# Patient Record
Sex: MALE
[De-identification: names, ages, dates, MRNs, and addresses within clinical notes are randomized per-mention and may not be internally consistent; named-entity substitution may affect disease eponyms.]

---

## 2013-06-26 RX ORDER — FLUCONAZOLE 150 MG/1
TABLET ORAL
Qty: 2 | Refills: 1
Start: 2013-06-26

## 2013-06-26 RX ORDER — ADAPALENE/BENZOYL PEROXIDE 0.1 %-2.5%
GEL (GRAM) TOPICAL
Qty: 45 | Refills: 6 | Status: DISCONTINUED
Start: 2013-06-26 | End: 2014-08-26

## 2013-06-26 RX ORDER — DOXYCYCLINE HYCLATE 150 MG/1
TABLET, DELAYED RELEASE ORAL
Qty: 30 | Refills: 2 | Status: DISCONTINUED
Start: 2013-06-26 | End: 2013-12-19

## 2020-10-21 ENCOUNTER — RX ONLY (RX ONLY)
Age: 30
End: 2020-10-21

## 2020-10-21 ENCOUNTER — SKIN CHECK (OUTPATIENT)
Dept: URBAN - METROPOLITAN AREA CLINIC 19 | Facility: CLINIC | Age: 30
Setting detail: DERMATOLOGY
End: 2020-10-21

## 2020-10-21 PROCEDURE — 99202 OFFICE O/P NEW SF 15 MIN: CPT

## 2020-10-21 RX ORDER — KETOCONAZOLE 20 MG/ML
SHAMPOO, SUSPENSION TOPICAL
Qty: 120 | Refills: 0
Start: 2020-10-21

## 2020-10-21 RX ORDER — FLUOCINOLONE ACETONIDE 0.11 MG/ML
OIL TOPICAL
Qty: 118.28 | Refills: 1
Start: 2020-10-21

## 2021-01-06 ENCOUNTER — RX ONLY (RX ONLY)
Age: 31
End: 2021-01-06

## 2021-01-06 ENCOUNTER — FOLLOW-UP (OUTPATIENT)
Dept: URBAN - METROPOLITAN AREA CLINIC 19 | Facility: CLINIC | Age: 31
Setting detail: DERMATOLOGY
End: 2021-01-06

## 2021-01-06 DIAGNOSIS — L91.0 HYPERTROPHIC SCAR: ICD-10-CM

## 2021-01-06 PROBLEM — L30.9 DERMATITIS, UNSPECIFIED: Status: RESOLVED | Noted: 2021-01-06

## 2021-01-06 PROCEDURE — 99213 OFFICE O/P EST LOW 20 MIN: CPT

## 2021-01-06 PROCEDURE — 11102 TANGNTL BX SKIN SINGLE LES: CPT

## 2021-01-06 RX ORDER — CLOBETASOL PROPIONATE 0.5 MG/ML
1 A SMALL AMOUNT SOLUTION TOPICAL ONCE A DAY
Qty: 50 | Refills: 0
Start: 2021-01-06

## 2021-01-06 RX ORDER — CLOBETASOL PROPIONATE 0.5 MG/G
1 A SMALL AMOUNT CREAM TOPICAL TWICE A DAY
Qty: 45 | Refills: 0
Start: 2021-01-06

## 2021-01-15 ENCOUNTER — RX ONLY (RX ONLY)
Age: 31
End: 2021-01-15

## 2021-01-15 RX ORDER — CALCIPOTRIENE AND BETAMETHASONE DIPROPIONATE 50; .5 UG/G; MG/G
A SMALL AMOUNT AEROSOL, FOAM TOPICAL ONCE A DAY
Qty: 60 | Refills: 1
Start: 2021-01-15

## 2021-01-20 ENCOUNTER — RX ONLY (RX ONLY)
Age: 31
End: 2021-01-20

## 2021-01-20 RX ORDER — CALCIPOTRIENE AND BETAMETHASONE DIPROPIONATE 50; .5 UG/G; MG/G
OINTMENT TOPICAL
Qty: 60 | Refills: 1
Start: 2021-01-20

## 2021-02-10 ENCOUNTER — FOLLOW-UP (OUTPATIENT)
Dept: URBAN - METROPOLITAN AREA CLINIC 19 | Facility: CLINIC | Age: 31
Setting detail: DERMATOLOGY
End: 2021-02-10

## 2021-02-10 DIAGNOSIS — L57.0 ACTINIC KERATOSIS: ICD-10-CM

## 2021-02-10 PROCEDURE — 99212 OFFICE O/P EST SF 10 MIN: CPT

## 2021-04-28 ENCOUNTER — RX ONLY (RX ONLY)
Age: 31
End: 2021-04-28

## 2021-04-28 RX ORDER — CLOBETASOL PROPIONATE 0.5 MG/ML
1 A SMALL AMOUNT SOLUTION TOPICAL ONCE A DAY
Qty: 50 | Refills: 6
Start: 2021-04-28

## 2022-11-10 ENCOUNTER — APPOINTMENT (OUTPATIENT)
Dept: URBAN - METROPOLITAN AREA SURGERY 12 | Age: 32
Setting detail: DERMATOLOGY
End: 2022-11-14

## 2022-11-10 DIAGNOSIS — L40.0 PSORIASIS VULGARIS: ICD-10-CM

## 2022-11-10 PROCEDURE — 99214 OFFICE O/P EST MOD 30 MIN: CPT

## 2022-11-10 PROCEDURE — OTHER MIPS QUALITY: OTHER

## 2022-11-10 PROCEDURE — OTHER ORDER TESTS: OTHER

## 2022-11-10 PROCEDURE — OTHER COUNSELING: OTHER

## 2022-11-10 PROCEDURE — OTHER PRESCRIPTION MEDICATION MANAGEMENT: OTHER

## 2022-11-10 NOTE — HPI: OTHER
Condition:: Pt returns to clinic to discuss his previous biopsy results and treatment options further. He was previously seen by Elvira Field NP who did a biopsy which showed psoriasis. Pt has been treating with topical steroids.
Please Describe Your Condition:: Pt states the psoriasis has started spreading

## 2022-11-10 NOTE — PROCEDURE: ORDER TESTS
Expected Date Of Service: 11/10/2022
Billing Type: Third-Party Bill
Bill For Surgical Tray: no
Performing Laboratory: [id number]
Lab Facility: [id number]

## 2022-11-10 NOTE — PROCEDURE: PRESCRIPTION MEDICATION MANAGEMENT
Detail Level: Zone
Render In Strict Bullet Format?: No
Initiate Treatment: Skyrizi pending insurance approval and wnl labs